# Patient Record
(demographics unavailable — no encounter records)

---

## 2018-08-09 NOTE — RAD
ACUTE ABDOMINAL SERIES

FRONTAL VIEW CHEST

TWO VIEW ABDOMEN:

 

Indication: Recurrent vomiting with epigastric pain, abdominal pain. 

 

FINDINGS: 

The lungs are clear. No free air beneath the hemidiaphragms. Cardiac silhouette is within normal limi
ts in size. The bowel gas pattern is nonobstructed. There is a rounded calcific density in the left l
ower quadrant, not fully characterized on the basis of this exam. 

 

IMPRESSION: 

1. No consolidation. 

2. No free air. 

3. Nonobstructed bowel gas pattern. 

4. Nonspecific calcific density of the left lower quadrant, not further localized, measuring approxim
ately 1 cm. 

 

POS: [facility]

## 2018-08-10 NOTE — CT
NONCONTRAST CT ABDOMEN AND PELVIS:

 

Date: 8-10-18 

 

History: Epigastric pain with emesis. History of gastritis. 

 

Comparison: None available. 

 

FINDINGS: 

Lack of intravenous contrast does limit sensitivity for evaluation of the parenchymal organs. However
, the lung bases, liver, spleen, pancreas, bilateral adrenal glands, kidneys, and incompletely disten
ded urinary bladder demonstrate a grossly normal nonenhanced CT appearance. 

 

No renal or ureteral calculi are seen bilaterally and there is no evidence of hydronephrosis. 

 

The appendix is visualized and filled with gas and it is normal in caliber. 

 

No free fluid, fluid collection, and lymphadenopathy is seen in the abdomen or pelvis. 

 

There is a small fat containing umbilical hernia. 

 

Minimal degenerative change is seen in the spine. 

 

IMPRESSION: 

1. No acute findings are seen on this nonenhanced CT scan of the abdomen and pelvis. 

2. No renal or ureteral calculi are seen bilaterally. 

3. No CT evidence of appendicitis. 

4. Tiny fat containing umbilical hernia. 

 

POS: [facility]